# Patient Record
Sex: MALE | Race: ASIAN | NOT HISPANIC OR LATINO | Employment: STUDENT | URBAN - METROPOLITAN AREA
[De-identification: names, ages, dates, MRNs, and addresses within clinical notes are randomized per-mention and may not be internally consistent; named-entity substitution may affect disease eponyms.]

---
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
## 2022-02-04 ENCOUNTER — APPOINTMENT (OUTPATIENT)
Dept: LAB | Facility: HOSPITAL | Age: 22
End: 2022-02-04

## 2022-02-04 DIAGNOSIS — Z11.1 TUBERCULOSIS SCREENING: ICD-10-CM

## 2022-02-04 PROCEDURE — 86480 TB TEST CELL IMMUN MEASURE: CPT

## 2022-02-04 PROCEDURE — 36415 COLL VENOUS BLD VENIPUNCTURE: CPT

## 2022-02-07 LAB
GAMMA INTERFERON BACKGROUND BLD IA-ACNC: 0.03 IU/ML
M TB IFN-G BLD-IMP: NEGATIVE
M TB IFN-G CD4+ BCKGRND COR BLD-ACNC: 0 IU/ML
M TB IFN-G CD4+ BCKGRND COR BLD-ACNC: 0 IU/ML
MITOGEN IGNF BCKGRD COR BLD-ACNC: 9.62 IU/ML

## 2022-07-13 ENCOUNTER — HOSPITAL ENCOUNTER (EMERGENCY)
Facility: HOSPITAL | Age: 22
Discharge: HOME | End: 2022-07-13
Attending: EMERGENCY MEDICINE | Admitting: EMERGENCY MEDICINE
Payer: COMMERCIAL

## 2022-07-13 VITALS
HEART RATE: 98 BPM | OXYGEN SATURATION: 97 % | SYSTOLIC BLOOD PRESSURE: 123 MMHG | DIASTOLIC BLOOD PRESSURE: 74 MMHG | RESPIRATION RATE: 20 BRPM | TEMPERATURE: 99.5 F

## 2022-07-13 DIAGNOSIS — J06.9 URI (UPPER RESPIRATORY INFECTION): Primary | ICD-10-CM

## 2022-07-13 PROCEDURE — 99283 EMERGENCY DEPT VISIT LOW MDM: CPT

## 2022-07-13 PROCEDURE — 99282 EMERGENCY DEPT VISIT SF MDM: CPT | Performed by: EMERGENCY MEDICINE

## 2022-07-13 NOTE — Clinical Note
Alfonso Liang was seen and treated in our emergency department on 7/13/2022  Diagnosis: Respiratory infection    Yamiletemilie    He may return on this date: 07/18/2022         If you have any questions or concerns, please don't hesitate to call        Carlota Mahan MD    ______________________________           _______________          _______________  [facility] Representative                              Date                                Time

## 2022-07-13 NOTE — Clinical Note
Alice Stapleton was seen and treated in our emergency department on 7/13/2022  Diagnosis: Respiratory infection    Tam    He may return on this date: 07/18/2022         If you have any questions or concerns, please don't hesitate to call        Enoch Verde MD    ______________________________           _______________          _______________  [facility] Representative                              Date                                Time

## 2022-07-13 NOTE — ED ATTENDING ATTESTATION
7/13/2022  INoreen MD, saw and evaluated the patient  I have discussed the patient with the resident/non-physician practitioner and agree with the resident's/non-physician practitioner's findings, Plan of Care, and MDM as documented in the resident's/non-physician practitioner's note, except where noted  All available labs and Radiology studies were reviewed  I was present for key portions of any procedure(s) performed by the resident/non-physician practitioner and I was immediately available to provide assistance  At this point I agree with the current assessment done in the Emergency Department  I have conducted an independent evaluation of this patient a history and physical is as follows:    ED Course      HPI: Patient is a 25 y o  male who presents with 4 days of fever which the patient describes at mild The patient has had contact with people with similar symptoms  The patient has not taken any medication  Patient had a positive covid test just prior to arrival in the ED  No Known Allergies    History reviewed  No pertinent past medical history  History reviewed  No pertinent surgical history  Social History     Tobacco Use    Smoking status: Never Smoker    Smokeless tobacco: Never Used   Substance Use Topics    Drug use: Never       Nursing notes reviewed  Physical Exam:  ED Triage Vitals [07/13/22 0536]   Temperature Pulse Respirations Blood Pressure SpO2   99 5 °F (37 5 °C) 98 20 123/74 97 %      Temp Source Heart Rate Source Patient Position - Orthostatic VS BP Location FiO2 (%)   Oral -- -- -- --      Pain Score       No Pain           ROS: Positive for congestion, the remainder of a 10 organ system ROS was otherwise unremarkable    General: awake, alert, no acute distress    Head: normocephalic, atraumatic    Eyes: no scleral icterus  Ears: external ears normal, hearing grossly intact  Nose: external exam grossly normal, positive nasal discharge  Neck: symmetric, No JVD noted, trachea midline  Pulmonary: no respiratory distress, no tachypnea noted  Cardiovascular: appears well perfused  Abdomen: no distention noted  Musculoskeletal: no deformities noted, tone normal  Neuro: grossly non-focal  Psych: mood and affect appropriate    The patient is stable and has a history and physical exam consistent with a viral illness  COVID19 testing has been performed  I considered the patient's other medical conditions as applicable/noted above in my medical decision making  The patient is stable upon discharge  The plan is for supportive care at home  The patient (and any family present) verbalized understanding of the discharge instructions and warnings that would necessitate return to the Emergency Department  All questions were answered prior to discharge  Medications - No data to display  Final diagnoses:   None     ED Disposition     None      Follow-up Information    None       Patient's Medications    No medications on file     No discharge procedures on file      Electronically Signed by        Critical Care Time  Procedures

## 2022-07-13 NOTE — ED PROVIDER NOTES
HPI: Patient is a 25 y o  male who presents with 4 days of fever, cough, headache, sore throat and fatigue which the patient describes at moderate The patient has had contact with people with similar symptoms  The patient taken OTC medication with relief of symptoms  Patient tested positive for COVID 1 hour prior to arrival   Patient does not want to be tested in the ED  He reports he receives 3 COVID vaccinations  No Known Allergies    History reviewed  No pertinent past medical history  History reviewed  No pertinent surgical history  Social History     Tobacco Use    Smoking status: Never Smoker    Smokeless tobacco: Never Used   Substance Use Topics    Drug use: Never       Nursing notes reviewed  Physical Exam:  ED Triage Vitals [07/13/22 0536]   Temperature Pulse Respirations Blood Pressure SpO2   99 5 °F (37 5 °C) 98 20 123/74 97 %      Temp Source Heart Rate Source Patient Position - Orthostatic VS BP Location FiO2 (%)   Oral -- -- -- --      Pain Score       No Pain           ROS: Positive for  fever, cough, headache, sore throat and fatigue, the remainder of a 10 organ system ROS was otherwise unremarkable  General: awake, alert, no acute distress  Head: normocephalic, atraumatic  Eyes: no scleral icterus  Ears: external ears normal, hearing grossly intact  Nose: external exam grossly normal, negative nasal discharge  Neck: symmetric, No JVD noted, trachea midline  Pulmonary: no respiratory distress, no tachypnea noted  Lungs clear to auscultation bilaterally  Cardiovascular: appears well perfused, regular rate and rhythm  Abdomen: no distention noted  Musculoskeletal: no deformities noted, tone normal  Neuro: grossly non-focal  Psych: mood and affect appropriate    The patient is stable and has a history and physical exam consistent with a viral illness  COVID19 testing has not been performed    I considered the patient's other medical conditions as applicable/noted above in my medical decision making  The patient is stable upon discharge  Pulse ox 98% at rest while I was in the room  Ambulatory pulse ox 98%  The plan is for supportive care at home  The patient (and any family present) verbalized understanding of the discharge instructions and warnings that would necessitate return to the Emergency Department  All questions were answered prior to discharge  Medications - No data to display  Final diagnoses:   URI (upper respiratory infection)     Time reflects when diagnosis was documented in both MDM as applicable and the Disposition within this note     Time User Action Codes Description Comment    7/13/2022  6:10 AM Perfecto Speaks Add [J06 9] URI (upper respiratory infection)       ED Disposition     ED Disposition   Discharge    Condition   Stable    Date/Time   Wed Jul 13, 2022  6:10 AM    Comment   [address] discharge to home/self care  Follow-up Information    None       Patient's Medications    No medications on file     No discharge procedures on file      Electronically Signed by       Enoch Verde MD  07/13/22 6317